# Patient Record
Sex: MALE | Race: BLACK OR AFRICAN AMERICAN | Employment: FULL TIME | URBAN - METROPOLITAN AREA
[De-identification: names, ages, dates, MRNs, and addresses within clinical notes are randomized per-mention and may not be internally consistent; named-entity substitution may affect disease eponyms.]

---

## 2022-03-03 ENCOUNTER — HOSPITAL ENCOUNTER (EMERGENCY)
Age: 32
Discharge: HOME | End: 2022-03-03

## 2022-03-03 VITALS
BODY MASS INDEX: 26.91 KG/M2 | HEART RATE: 88 BPM | RESPIRATION RATE: 16 BRPM | WEIGHT: 209.66 LBS | TEMPERATURE: 97.8 F | DIASTOLIC BLOOD PRESSURE: 81 MMHG | HEIGHT: 74 IN | OXYGEN SATURATION: 98 % | SYSTOLIC BLOOD PRESSURE: 148 MMHG

## 2022-03-03 DIAGNOSIS — S39.012A BACK STRAIN, INITIAL ENCOUNTER: Primary | ICD-10-CM

## 2022-03-03 DIAGNOSIS — S16.1XXA ACUTE STRAIN OF NECK MUSCLE, INITIAL ENCOUNTER: ICD-10-CM

## 2022-03-03 PROCEDURE — 6360000002 HC RX W HCPCS: Performed by: PHYSICIAN ASSISTANT

## 2022-03-03 PROCEDURE — 96372 THER/PROPH/DIAG INJ SC/IM: CPT

## 2022-03-03 PROCEDURE — 6370000000 HC RX 637 (ALT 250 FOR IP): Performed by: PHYSICIAN ASSISTANT

## 2022-03-03 PROCEDURE — 99282 EMERGENCY DEPT VISIT SF MDM: CPT

## 2022-03-03 RX ORDER — PREDNISONE 20 MG/1
20 TABLET ORAL 2 TIMES DAILY
Qty: 10 TABLET | Refills: 0 | Status: SHIPPED | OUTPATIENT
Start: 2022-03-03 | End: 2022-03-08

## 2022-03-03 RX ORDER — CYCLOBENZAPRINE HCL 10 MG
10 TABLET ORAL 3 TIMES DAILY PRN
Qty: 21 TABLET | Refills: 0 | Status: SHIPPED | OUTPATIENT
Start: 2022-03-03 | End: 2022-03-13

## 2022-03-03 RX ORDER — DEXAMETHASONE SODIUM PHOSPHATE 4 MG/ML
4 INJECTION, SOLUTION INTRA-ARTICULAR; INTRALESIONAL; INTRAMUSCULAR; INTRAVENOUS; SOFT TISSUE ONCE
Status: COMPLETED | OUTPATIENT
Start: 2022-03-03 | End: 2022-03-03

## 2022-03-03 RX ORDER — CYCLOBENZAPRINE HCL 10 MG
10 TABLET ORAL ONCE
Status: COMPLETED | OUTPATIENT
Start: 2022-03-03 | End: 2022-03-03

## 2022-03-03 RX ADMIN — DEXAMETHASONE SODIUM PHOSPHATE 4 MG: 4 INJECTION, SOLUTION INTRAMUSCULAR; INTRAVENOUS at 20:44

## 2022-03-03 RX ADMIN — CYCLOBENZAPRINE HYDROCHLORIDE 10 MG: 10 TABLET, FILM COATED ORAL at 20:46

## 2022-03-03 ASSESSMENT — PAIN DESCRIPTION - LOCATION: LOCATION: BACK;NECK

## 2022-03-03 ASSESSMENT — ENCOUNTER SYMPTOMS
CONSTIPATION: 1
VOMITING: 0
DIARRHEA: 0
BACK PAIN: 1

## 2022-03-03 ASSESSMENT — PAIN - FUNCTIONAL ASSESSMENT: PAIN_FUNCTIONAL_ASSESSMENT: 0-10

## 2022-03-03 ASSESSMENT — PAIN SCALES - GENERAL: PAINLEVEL_OUTOF10: 10

## 2022-03-03 ASSESSMENT — PAIN DESCRIPTION - DESCRIPTORS: DESCRIPTORS: SHARP;BURNING

## 2022-03-03 ASSESSMENT — PAIN DESCRIPTION - ONSET: ONSET: GRADUAL

## 2022-03-03 ASSESSMENT — PAIN DESCRIPTION - FREQUENCY: FREQUENCY: CONTINUOUS

## 2022-03-03 ASSESSMENT — PAIN DESCRIPTION - PAIN TYPE: TYPE: ACUTE PAIN

## 2022-03-03 NOTE — Clinical Note
Darren Betancourt was seen and treated in our emergency department on 3/3/2022. He may return to work on 03/07/2022. If you have any questions or concerns, please don't hesitate to call.       Ponce Munguia PA-C

## 2022-03-04 NOTE — ED PROVIDER NOTES
[address] [facility]        Pt Name: Darren Betancourt  MRN: [id number]  [place] 1990  Date of evaluation: 3/3/2022  Provider: Ponce Munguia PA-C  PCP: No primary care provider on file. Note Started: 8:20 PM EST      RACHELLE. I have evaluated this patient. My supervising physician was available for consultation. Triage CHIEF COMPLAINT       Chief Complaint   Patient presents with    Back Pain     pt states upper back/neck pain for 6 days         HISTORY OF PRESENT ILLNESS   (Location/Symptom, Timing/Onset, Context/Setting, Quality, Duration, Modifying Factors, Severity)  Note limiting factors. Chief Complaint: Neck pain    Darren Betancourt is a 36 y.o. male who presents to the ED with complaints of neck pain and mid back pain that started 6 days ago. Patient states that occasionally the pain will radiate down into his arm, and leg. He states that he woke up with this pain, denies any recent trauma. Patient states that he has had constipation for the past several days, however this occurred after he took a Percocet. He denies fevers, chills, difficulty urinating, IV drug use. Nursing Notes were all reviewed and agreed with or any disagreements were addressed in the HPI. REVIEW OF SYSTEMS    (2-9 systems for level 4, 10 or more for level 5)     Review of Systems   Constitutional: Negative for chills and fever. Gastrointestinal: Positive for constipation. Negative for diarrhea and vomiting. Genitourinary: Negative for decreased urine volume and difficulty urinating. Musculoskeletal: Positive for back pain and neck pain. Negative for gait problem, myalgias and neck stiffness. Skin: Negative for rash and wound. PAST MEDICAL HISTORY   History reviewed. No pertinent past medical history. SURGICAL HISTORY   History reviewed. No pertinent surgical history.     CURRENTMEDICATIONS       Previous Medications    No medications on file       ALLERGIES     Amoxicillin    FAMILYHISTORY     History reviewed. No pertinent family history. SOCIAL HISTORY       Social History     Socioeconomic History    Marital status: None     Spouse name: None    Number of children: None    Years of education: None    Highest education level: None   Occupational History    None   Tobacco Use    Smoking status: Current Every Day Smoker     Packs/day: 0.50    Smokeless tobacco: Never Used   Substance and Sexual Activity    Alcohol use: Never    Drug use: Yes     Types: Marijuana Veldon Bunting)    Sexual activity: None   Other Topics Concern    None   Social History Narrative    None     Social Determinants of Health     Financial Resource Strain:     Difficulty of Paying Living Expenses: Not on file   Food Insecurity:     Worried About Running Out of Food in the Last Year: Not on file    Yandel of Food in the Last Year: Not on file   Transportation Needs:     Lack of Transportation (Medical): Not on file    Lack of Transportation (Non-Medical):  Not on file   Physical Activity:     Days of Exercise per Week: Not on file    Minutes of Exercise per Session: Not on file   Stress:     Feeling of Stress : Not on file   Social Connections:     Frequency of Communication with Friends and Family: Not on file    Frequency of Social Gatherings with Friends and Family: Not on file    Attends [employer]ist Services: Not on file    Active Member of [address] [place] [address] or Organizations: Not on file    Attends Club or Organization Meetings: Not on file    Marital Status: Not on file   Intimate Partner Violence:     Fear of Current or Ex-Partner: Not on file    Emotionally Abused: Not on file    Physically Abused: Not on file    Sexually Abused: Not on file   Housing Stability:     Unable to Pay for Housing in the Last Year: Not on file    Number of [place] in the Last Year: Not on file    Unstable Housing in the Last Year: Not on file SCREENINGS             PHYSICAL EXAM    (up to 7 for level 4, 8 or more for level 5)     ED Triage Vitals [03/03/22 1932]   BP Temp Temp Source Pulse Resp SpO2 Height Weight   (!) 151/101 97.2 °F (36.2 °C) Tympanic 91 14 98 % 6' 2\" (1.88 m) 209 lb 10.5 oz (95.1 kg)       Physical Exam  Constitutional:       General: He is not in acute distress. Appearance: Normal appearance. He is not ill-appearing, toxic-appearing or diaphoretic. HENT:      Head: Normocephalic and atraumatic. Right Ear: External ear normal.      Left Ear: External ear normal.      Nose: Nose normal.   Eyes:      General:         Right eye: No discharge. Left eye: No discharge. Neck:      Comments: Patient with midline and left-sided tenderness to neck  No difficulty with active range of motion  Sensation intact in bilateral arms, bilateral legs  + Straight leg test on left side  No rashes seen. Patient does have a scar to the left side of his neck, that he states is self-inflicted  Pulmonary:      Effort: Pulmonary effort is normal. No respiratory distress. Abdominal:      General: Bowel sounds are normal. There is no distension. Palpations: Abdomen is soft. Tenderness: There is no abdominal tenderness. There is no right CVA tenderness, left CVA tenderness or rebound. Musculoskeletal:         General: Normal range of motion. Cervical back: Normal range of motion. Tenderness present. Skin:     General: Skin is warm and dry. Neurological:      General: No focal deficit present. Mental Status: He is alert and oriented to person, place, and time. Psychiatric:         Mood and Affect: Mood normal.         Behavior: Behavior normal.         DIAGNOSTIC RESULTS   LABS:    Labs Reviewed - No data to display    When ordered, only abnormal lab results are displayed. All other labs were within normal range or not returned as of this dictation. EKG:  When ordered, EKG's are interpreted by the Emergency Department Physician in the absence of a cardiologist.  Please see their note for interpretation of EKG. RADIOLOGY:   Non-plain film images such as CT, Ultrasound and MRI are read by the radiologist. Plain radiographic images are visualized andpreliminarily interpreted by the  ED Provider with the below findings:        Interpretation perthe Radiologist below, if available at the time of this note:    No orders to display     No results found. PROCEDURES   Unless otherwise noted below, none     Procedures    CRITICAL CARE TIME   N/A    CONSULTS:  None      EMERGENCY DEPARTMENT COURSE and DIFFERENTIAL DIAGNOSIS/MDM:   Vitals:    Vitals:    03/03/22 1932   BP: (!) 151/101   Pulse: 91   Resp: 14   Temp: 97.2 °F (36.2 °C)   TempSrc: Tympanic   SpO2: 98%   Weight: 209 lb 10.5 oz (95.1 kg)   Height: 6' 2\" (1.88 m)       Patient was given thefollowing medications:  Medications   dexamethasone (DECADRON) injection 4 mg (has no administration in time range)           This is a 72-year-old male who presents ED with complaints of left-sided neck pain that started about 6 days ago. Upon arrival patient's vitals show that he is hypertensive but otherwise within normal limits. Discussed with patient imaging, via CT and he declined at this time due to radiation exposure. He would like to try conservative measures first.  We will try him on prednisone and muscle relaxers for further relief. Discussed with patient active stretching, continued movement. He will be discharged in stable condition to his home. He was given a shot of IM Decadron prior to discharge. Patient was agreeable to follow-up with orthopedic, and return to ED if new worsening or more concerning symptoms present. FINAL IMPRESSION      1. Back strain, initial encounter    2.  Acute strain of neck muscle, initial encounter          DISPOSITION/PLAN   DISPOSITION Decision To Discharge 03/03/2022 08:19:43 PM      PATIENT REFERREDTO:  Harsha Hoffman Linda Andersen.   [address]    Schedule an appointment as soon as possible for a visit in 2 days  for reevaluation    [facility] Emergency Department  [address] [id number] [phone]  Go to   As needed, If symptoms worsen      DISCHARGE MEDICATIONS:  New Prescriptions    CYCLOBENZAPRINE (FLEXERIL) 10 MG TABLET    Take 1 tablet by mouth 3 times daily as needed for Muscle spasms    PREDNISONE (DELTASONE) 20 MG TABLET    Take 1 tablet by mouth 2 times daily for 5 days       DISCONTINUED MEDICATIONS:  Discontinued Medications    No medications on file              (Please note that portions ofthis note were completed with a voice recognition program.  Efforts were made to edit the dictations but occasionally words are mis-transcribed.)    Henrik Hammond PA-C (electronically signed)             Henrik Hammond PA-C  03/03/22 9586

## 2022-03-10 ENCOUNTER — HOSPITAL ENCOUNTER (EMERGENCY)
Age: 32
Discharge: HOME | End: 2022-03-10
Attending: EMERGENCY MEDICINE

## 2022-03-10 ENCOUNTER — APPOINTMENT (OUTPATIENT)
Dept: GENERAL RADIOLOGY | Age: 32
End: 2022-03-10

## 2022-03-10 VITALS
SYSTOLIC BLOOD PRESSURE: 134 MMHG | WEIGHT: 211.64 LBS | TEMPERATURE: 98 F | DIASTOLIC BLOOD PRESSURE: 83 MMHG | OXYGEN SATURATION: 99 % | RESPIRATION RATE: 17 BRPM | BODY MASS INDEX: 27.16 KG/M2 | HEIGHT: 74 IN | HEART RATE: 87 BPM

## 2022-03-10 DIAGNOSIS — J18.9 PNEUMONIA OF RIGHT LOWER LOBE DUE TO INFECTIOUS ORGANISM: Primary | ICD-10-CM

## 2022-03-10 LAB
A/G RATIO: 1.6 (ref 1.1–2.2)
ALBUMIN SERPL-MCNC: 4.1 G/DL (ref 3.4–5)
ALP BLD-CCNC: 72 U/L (ref 40–129)
ALT SERPL-CCNC: 10 U/L (ref 10–40)
ANION GAP SERPL CALCULATED.3IONS-SCNC: 13 MMOL/L (ref 3–16)
AST SERPL-CCNC: 9 U/L (ref 15–37)
BASOPHILS ABSOLUTE: 0.1 K/UL (ref 0–0.2)
BASOPHILS RELATIVE PERCENT: 1 %
BILIRUB SERPL-MCNC: 0.3 MG/DL (ref 0–1)
BUN BLDV-MCNC: 11 MG/DL (ref 7–20)
CALCIUM SERPL-MCNC: 8.8 MG/DL (ref 8.3–10.6)
CHLORIDE BLD-SCNC: 106 MMOL/L (ref 99–110)
CO2: 23 MMOL/L (ref 21–32)
CREAT SERPL-MCNC: 0.9 MG/DL (ref 0.9–1.3)
D DIMER: <200 NG/ML DDU (ref 0–229)
EKG ATRIAL RATE: 92 BPM
EKG DIAGNOSIS: NORMAL
EKG P AXIS: 45 DEGREES
EKG P-R INTERVAL: 154 MS
EKG Q-T INTERVAL: 320 MS
EKG QRS DURATION: 92 MS
EKG QTC CALCULATION (BAZETT): 395 MS
EKG R AXIS: 66 DEGREES
EKG T AXIS: 41 DEGREES
EKG VENTRICULAR RATE: 92 BPM
EOSINOPHILS ABSOLUTE: 0.5 K/UL (ref 0–0.6)
EOSINOPHILS RELATIVE PERCENT: 5.4 %
GFR AFRICAN AMERICAN: >60
GFR NON-AFRICAN AMERICAN: >60
GLUCOSE BLD-MCNC: 85 MG/DL (ref 70–99)
HCT VFR BLD CALC: 45.2 % (ref 40.5–52.5)
HEMOGLOBIN: 14.8 G/DL (ref 13.5–17.5)
LYMPHOCYTES ABSOLUTE: 3.6 K/UL (ref 1–5.1)
LYMPHOCYTES RELATIVE PERCENT: 42.8 %
MCH RBC QN AUTO: 27.9 PG (ref 26–34)
MCHC RBC AUTO-ENTMCNC: 32.8 G/DL (ref 31–36)
MCV RBC AUTO: 85.1 FL (ref 80–100)
MONOCYTES ABSOLUTE: 0.7 K/UL (ref 0–1.3)
MONOCYTES RELATIVE PERCENT: 8.2 %
NEUTROPHILS ABSOLUTE: 3.5 K/UL (ref 1.7–7.7)
NEUTROPHILS RELATIVE PERCENT: 42.6 %
PDW BLD-RTO: 13 % (ref 12.4–15.4)
PLATELET # BLD: 205 K/UL (ref 135–450)
PMV BLD AUTO: 9.6 FL (ref 5–10.5)
POTASSIUM REFLEX MAGNESIUM: 3.6 MMOL/L (ref 3.5–5.1)
RBC # BLD: 5.31 M/UL (ref 4.2–5.9)
SARS-COV-2, NAAT: NOT DETECTED
SODIUM BLD-SCNC: 142 MMOL/L (ref 136–145)
TOTAL PROTEIN: 6.7 G/DL (ref 6.4–8.2)
TROPONIN: <0.01 NG/ML
WBC # BLD: 8.3 K/UL (ref 4–11)

## 2022-03-10 PROCEDURE — 85379 FIBRIN DEGRADATION QUANT: CPT

## 2022-03-10 PROCEDURE — 84484 ASSAY OF TROPONIN QUANT: CPT

## 2022-03-10 PROCEDURE — 71046 X-RAY EXAM CHEST 2 VIEWS: CPT

## 2022-03-10 PROCEDURE — 99284 EMERGENCY DEPT VISIT MOD MDM: CPT

## 2022-03-10 PROCEDURE — 85025 COMPLETE CBC W/AUTO DIFF WBC: CPT

## 2022-03-10 PROCEDURE — 93005 ELECTROCARDIOGRAM TRACING: CPT | Performed by: NURSE PRACTITIONER

## 2022-03-10 PROCEDURE — 93010 ELECTROCARDIOGRAM REPORT: CPT | Performed by: INTERNAL MEDICINE

## 2022-03-10 PROCEDURE — 6370000000 HC RX 637 (ALT 250 FOR IP): Performed by: NURSE PRACTITIONER

## 2022-03-10 PROCEDURE — 87635 SARS-COV-2 COVID-19 AMP PRB: CPT

## 2022-03-10 PROCEDURE — 80053 COMPREHEN METABOLIC PANEL: CPT

## 2022-03-10 RX ORDER — LIDOCAINE 4 G/G
1 PATCH TOPICAL DAILY
Status: DISCONTINUED | OUTPATIENT
Start: 2022-03-10 | End: 2022-03-10 | Stop reason: HOSPADM

## 2022-03-10 RX ORDER — ACETAMINOPHEN 500 MG
1000 TABLET ORAL ONCE
Status: COMPLETED | OUTPATIENT
Start: 2022-03-10 | End: 2022-03-10

## 2022-03-10 RX ORDER — ASPIRIN 81 MG/1
324 TABLET, CHEWABLE ORAL ONCE
Status: COMPLETED | OUTPATIENT
Start: 2022-03-10 | End: 2022-03-10

## 2022-03-10 RX ORDER — AZITHROMYCIN 250 MG/1
TABLET, FILM COATED ORAL
Qty: 1 PACKET | Refills: 0 | Status: SHIPPED | OUTPATIENT
Start: 2022-03-10 | End: 2022-03-20

## 2022-03-10 RX ORDER — AZITHROMYCIN 500 MG/1
500 TABLET, FILM COATED ORAL ONCE
Status: COMPLETED | OUTPATIENT
Start: 2022-03-10 | End: 2022-03-10

## 2022-03-10 RX ADMIN — AZITHROMYCIN MONOHYDRATE 500 MG: 500 TABLET ORAL at 12:01

## 2022-03-10 RX ADMIN — ACETAMINOPHEN 1000 MG: 500 TABLET ORAL at 09:15

## 2022-03-10 RX ADMIN — ASPIRIN 81 MG 324 MG: 81 TABLET ORAL at 09:15

## 2022-03-10 ASSESSMENT — PAIN SCALES - GENERAL
PAINLEVEL_OUTOF10: 8
PAINLEVEL_OUTOF10: 8
PAINLEVEL_OUTOF10: 4

## 2022-03-10 ASSESSMENT — PAIN DESCRIPTION - PAIN TYPE
TYPE: ACUTE PAIN
TYPE: ACUTE PAIN

## 2022-03-10 ASSESSMENT — PAIN DESCRIPTION - DESCRIPTORS: DESCRIPTORS: PRESSURE

## 2022-03-10 ASSESSMENT — PAIN DESCRIPTION - ORIENTATION
ORIENTATION: LEFT
ORIENTATION: LEFT

## 2022-03-10 ASSESSMENT — PAIN DESCRIPTION - PROGRESSION: CLINICAL_PROGRESSION: NOT CHANGED

## 2022-03-10 ASSESSMENT — PAIN - FUNCTIONAL ASSESSMENT: PAIN_FUNCTIONAL_ASSESSMENT: PREVENTS OR INTERFERES SOME ACTIVE ACTIVITIES AND ADLS

## 2022-03-10 ASSESSMENT — PAIN DESCRIPTION - ONSET: ONSET: ON-GOING

## 2022-03-10 ASSESSMENT — PAIN DESCRIPTION - LOCATION
LOCATION: CHEST
LOCATION: CHEST

## 2022-03-10 ASSESSMENT — PAIN DESCRIPTION - FREQUENCY: FREQUENCY: CONTINUOUS

## 2022-03-10 NOTE — ED PROVIDER NOTES
[facility] Emergency Department    CHIEF COMPLAINT  Chest Pain (Pt to ED with c/o lower back pain and left side chest pain. States chest pain radiates to his shoulder and upper back, started 3 days ago. States lower back pain started started 1 week ago.) and Back Pain      SHARED SERVICE VISIT  I have seen and evaluated this patient with my supervising physician, Dr. Tiffanie Granados. HISTORY OF PRESENT ILLNESS  Claudette Mire is a 36 y.o., nontoxic, well-appearing male in mild distress who presents to the ED complaining of exacerbation of mid left back pain which 7 days ago and has progressively worsened. He was seen here at that time and was prescribed a steroid and a muscle relaxer and was to follow-up with orthopedics. He states the earliest appointment he can get will be in 2 weeks and therefore returns to the emergency department. He states that for the past 2 days he has been experiencing exacerbation of left-sided chest pain with radiation to his left arm. He describes the left arm discomfort as \"numbness\" to severity of 5/10. Accompanying symptoms include lightheadedness. Denies nausea, vomiting, dizziness, presyncope shortness of breath, diaphoresis, fever, chills, sweats, cough, hemoptysis, leg/calf pain or swelling, abdominal pain, diarrhea, or other concerns. Nursing notes reviewed. History reviewed. No pertinent past medical history. History reviewed. No pertinent surgical history. History reviewed. No pertinent family history.   Social History     Socioeconomic History    Marital status: Single     Spouse name: Not on file    Number of children: Not on file    Years of education: Not on file    Highest education level: Not on file   Occupational History    Not on file   Tobacco Use    Smoking status: Current Every Day Smoker     Packs/day: 0.50    Smokeless tobacco: Never Used   Vaping Use    Vaping Use: Never used   Substance and Sexual Activity  Alcohol use: Never    Drug use: Yes     Types: Marijuana [employer]ma Marts)    Sexual activity: Not Currently   Other Topics Concern    Not on file   Social History Narrative    Not on file     Social Determinants of Health     Financial Resource Strain:     Difficulty of Paying Living Expenses: Not on file   Food Insecurity:     Worried About Running Out of Food in the Last Year: Not on file    Yandel of Food in the Last Year: Not on file   Transportation Needs:     Lack of Transportation (Medical): Not on file    Lack of Transportation (Non-Medical):  Not on file   Physical Activity:     Days of Exercise per Week: Not on file    Minutes of Exercise per Session: Not on file   Stress:     Feeling of Stress : Not on file   Social Connections:     Frequency of Communication with Friends and Family: Not on file    Frequency of Social Gatherings with Friends and Family: Not on file    Attends Jain Services: Not on file    Active Member of [employer] or Organizations: Not on file    Attends Club or Organization Meetings: Not on file    Marital Status: Not on file   Intimate Partner Violence:     Fear of Current or Ex-Partner: Not on file    Emotionally Abused: Not on file    Physically Abused: Not on file    Sexually Abused: Not on file   Housing Stability:     Unable to Pay for Housing in the Last Year: Not on file    Number of [place] in the Last Year: Not on file    Unstable Housing in the Last Year: Not on file     Current Facility-Administered Medications   Medication Dose Route Frequency Provider Last Rate Last Admin    aspirin chewable tablet 324 mg  324 mg Oral Once Derek Marlonril, APRN - CNP        lidocaine 4 % external patch 1 patch  1 patch TransDERmal Daily Derek Mandril, APRN - CNP        acetaminophen (TYLENOL) tablet 1,000 mg  1,000 mg Oral Once Derek Marlonril APRN - CNP         Current Outpatient Medications   Medication Sig Dispense Refill    cyclobenzaprine (FLEXERIL) 10 MG tablet Take 1 tablet by mouth 3 times daily as needed for Muscle spasms 21 tablet 0     Allergies   Allergen Reactions    Amoxicillin        REVIEW OF SYSTEMS  12 systems reviewed, pertinent positives per HPI otherwise noted to be negative    PHYSICAL EXAM  BP (!) 137/96   Pulse 102   Temp 97.8 °F (36.6 °C) (Oral)   Resp 16   Ht 6' 2\" (1.88 m)   Wt 211 lb 10.3 oz (96 kg)   SpO2 100%   BMI 27.17 kg/m²   GENERAL APPEARANCE: Awake and alert. Cooperative.  + Mild distress. Non-toxic in appearance. HEAD: Normocephalic. Atraumatic. EYES: PERRL. EOM's grossly intact. ENT: Mucous membranes are moist.   NECK: Supple. HEART: RRR. No murmurs, rubs, gallops.  + S1-S2  LUNGS: Respirations unlabored. CTAB. Good air exchange. Speaking comfortably in full sentences. ABDOMEN: Soft. Non-distended. Non-tender. No guarding or rebound. There is no midline pulsatile abdominal mass. + Bowel sounds x4 quadrants. No masses. No organomegaly. EXTREMITIES: No peripheral edema. Moves all extremities equally. All extremities neurovascularly intact. Radial pulse equal bilaterally. SKIN: Warm and dry. No acute rashes. NEUROLOGICAL: Alert and oriented. CN's 2-12 intact. No gross facial drooping. Strength 5/5, sensation intact. PSYCHIATRIC: Normal mood and affect. RADIOLOGY  No results found. ED COURSE  Patient received lidocaine patch and Tylenol for pain, with good relief. Triage vitals stable but hypertensive at 137/96 mmHg to tachycardic rate of 102 bpm.  Cardiac workup was initiated to include CBC, BMP, D-dimer, cardiac labs, COVID-19, rapid, CXR, and EKG.       Labs Ordered:  I have reviewed and interpreted all of the currently available lab results from this visit:  Results for orders placed or performed during the hospital encounter of 03/10/22   COVID-19, Rapid    Specimen: Nasopharyngeal Swab   Result Value Ref Range    SARS-CoV-2, NAAT Not Detected Not Detected   CBC with Auto Differential   Result Value Ref Range    WBC 8.3 4.0 - 11.0 K/uL    RBC 5.31 4.20 - 5.90 M/uL    Hemoglobin 14.8 13.5 - 17.5 g/dL    Hematocrit 45.2 40.5 - 52.5 %    MCV 85.1 80.0 - 100.0 fL    MCH 27.9 26.0 - 34.0 pg    MCHC 32.8 31.0 - 36.0 g/dL    RDW 13.0 12.4 - 15.4 %    Platelets 093 976 - 079 K/uL    MPV 9.6 5.0 - 10.5 fL    Neutrophils % 42.6 %    Lymphocytes % 42.8 %    Monocytes % 8.2 %    Eosinophils % 5.4 %    Basophils % 1.0 %    Neutrophils Absolute 3.5 1.7 - 7.7 K/uL    Lymphocytes Absolute 3.6 1.0 - 5.1 K/uL    Monocytes Absolute 0.7 0.0 - 1.3 K/uL    Eosinophils Absolute 0.5 0.0 - 0.6 K/uL    Basophils Absolute 0.1 0.0 - 0.2 K/uL   Comprehensive Metabolic Panel w/ Reflex to MG   Result Value Ref Range    Sodium 142 136 - 145 mmol/L    Potassium reflex Magnesium 3.6 3.5 - 5.1 mmol/L    Chloride 106 99 - 110 mmol/L    CO2 23 21 - 32 mmol/L    Anion Gap 13 3 - 16    Glucose 85 70 - 99 mg/dL    BUN 11 7 - 20 mg/dL    CREATININE 0.9 0.9 - 1.3 mg/dL    GFR Non-African American >60 >60    GFR African American >60 >60    Calcium 8.8 8.3 - 10.6 mg/dL    Total Protein 6.7 6.4 - 8.2 g/dL    Albumin 4.1 3.4 - 5.0 g/dL    Albumin/Globulin Ratio 1.6 1.1 - 2.2    Total Bilirubin 0.3 0.0 - 1.0 mg/dL    Alkaline Phosphatase 72 40 - 129 U/L    ALT 10 10 - 40 U/L    AST 9 (L) 15 - 37 U/L   Troponin   Result Value Ref Range    Troponin <0.01 <0.01 ng/mL   D-Dimer, Quantitative   Result Value Ref Range    D-Dimer, Quant <200 0 - 229 ng/mL DDU   EKG 12 Lead   Result Value Ref Range    Ventricular Rate 92 BPM    Atrial Rate 92 BPM    P-R Interval 154 ms    QRS Duration 92 ms    Q-T Interval 320 ms    QTc Calculation (Bazett) 395 ms    P Axis 45 degrees    R Axis 66 degrees    T Axis 41 degrees    Diagnosis       Normal sinus rhythm with sinus arrhythmiaConfirmed by Spencer ALCAZAR MD ([id number]) on 3/10/2022 6:21:08 PM             Imaging ordered:  XR CHEST (2 VW)    Result Date: 3/10/2022  Mild right lower lobe infiltrate. Interventions:  Patient was given  here in the emergency department. Reevaluation:  On reevaluation I find the 79-year-old gentleman, Mr. Laury Rojas resting comfortably in [place] [address] chair with eyes open with stable vital signs. A discussion was had with Mr. Lauyr Rojas regarding pneumonia right lower lobe due to infectious organism and insisted discharge. Risk management discussed and shared decision making had with patient and/or surrogate. All questions were answered. Patient will follow up with the PRS for further evaluation/treatment. All questions answered. Patient will return to ED for new/worsening symptoms. Patient is agreeable with this plan. Patient was sent home with a prescription for Z-Shai. CRITICAL CARE TIME  0 Minutes of critical care time spent not including separately billable procedures. MDM  Patient presents to the emergency department with chest pain. Alternate diagnoses are less likely based on history and physical. Alternate diagnoses are less likely based on history and physical. Considered myocardial infarction, aortic dissection, pulmonary embolism, tension pneumothorax, endocarditis, GERD, and pneumonia but less likely based on history and physical. Considered MI but no ischemic changes on EKG and no elevation in troponin. Considered aortic dissection but less likely as no radiation of pain into back with ripping/tearing sensation, there are equal radial pulses bilaterally, and there is no midline pulsatile abdominal mass. Considered  pulmonary embolism but less likely as no cough or hemoptysis, and no DVT symptoms. D-dimer was not elevated. Considered COVID-19: Not detected by laboratory testing. Considered tension pneumothorax but less likely as no unilaterally diminished breath sounds or tracheal deviation. Considered endocarditis but less likely as no fever, murmur, or IV drug use.  Considered GERD but less likely as no postprandial epigastric abdominal/throat burning. Work-up reveals (Abnormal labs/imaging noted and otherwise normal)    No COVID-19, no elevation in D-dimer, no elevation of troponin, no electrolyte derangement, renal dysfunction, elevation in LFTs, no leukocytosis or anemia, no ischemic changes on EKG. However CXR identifies a mild right lower lobe infiltrate. Patient afebrile and is saturating at 99% on room air. No respiratory distress. He is otherwise young and healthy and therefore will be trialed on outpatient antibiotics. Strict return precautions. Patient verbalized understands agreeable to plan for discharge and follow-up. Clinical Impression:  Pneumonia of the right lower lobe due to infectious organism      DISPOSITION  Discharged with referral to the [facility] for outpatient follow-up    Katelynn Soria CNP   [employer]    This chart was created using Dragon dictation. Every effort was made by myself to ensure accuracy, however due to limitations of this technology errors may be present.       ESTUARDO Triplett - JOSELINE  03/13/22 0103

## 2022-03-10 NOTE — ED PROVIDER NOTES
[address] [facility]      Pt Name: Judy Bustos  MRN: [id number]  Brockgfadonis 1990  Date of evaluation: 3/10/2022  Provider: Claudean Flair, [address] [place] [address]  Chief Complaint   Patient presents with    Chest Pain     Pt to ED with c/o lower back pain and left side chest pain. States chest pain radiates to his shoulder and upper back, started 3 days ago. States lower back pain started started 1 week ago.  Back Pain       I have fully participated in the care of Judy Bustos and have had a face-to-face evaluation. I have reviewed and agree with all pertinent clinical information, and midlevel provider's history, and physical exam. I have also reviewed the labs, EKG, and imaging studies and treatment plan. I have also reviewed and agree with the medications, allergies and past medical history section for this Judy Bustos. I agree with the diagnosis, and I concur. I wore personal protective equipment when I was in the room the entire time. This includes gloves, N95 mask, face shield, and a glove over my stethoscope for protection. History reviewed. No pertinent past medical history. MDM:  Judy Bustos is a 36 y.o. male who presents with chest and back pain that started 3 days ago. Is gotten worse. He was evaluated by physician but has gotten worse. He denies any fevers or chills. Nuys any nausea vomiting. Denies any injuries. Nothing makes it better or worse. Denies any previous history. Physical exam reveals patient to be talking in full sentences. His pulse ox is normal.  Heart is regular rate and rhythm without murmurs clicks or rubs. Is mildly tachycardic. Lungs are clear auscultation equal bilaterally. There are no wheezes rales or rhonchi. Abdomen soft and nontender. Chest x-ray reveals a Right lower lobe infiltrate. D-dimer is negative. Troponin is negative.   The remainder of his work-up is unremarkable. His white count is normal.  He does not meet SIRS criteria for sepsis. His Covid test revealed a negative result. Therefore, patient was discharged with antibiotics. He was given prescription for transdermal patch and instructed to take a chewable aspirin daily. He was instructed to follow-up with his doctor in 3 to 5 days and return if any problems. Vitals:    03/10/22 1200   BP: 134/83   Pulse: 87   Resp: 17   Temp: 98 °F (36.7 °C)   SpO2: 99%       Lab results  Labs Reviewed   COMPREHENSIVE METABOLIC PANEL W/ REFLEX TO MG FOR LOW K - Abnormal; Notable for the following components:       Result Value    AST 9 (*)     All other components within normal limits   COVID-19, RAPID   CBC WITH AUTO DIFFERENTIAL   TROPONIN   D-DIMER, QUANTITATIVE       EKG Results  EKG Interpretation    Interpreted by emergency department physician  Time performed: 2639  Time read: 0835    Rhythm: Sinus  Ventricular Rate: 92  QRS Axis: 66  Ectopy: Sinus arrhythmia  Conduction: Sinus rhythm with sinus arrhythmia with LVH by voltage and early repolarization abnormalities, nonspecific interventricular conduction delay  ST Segments: Consistent with early repolarization abnormality  T Waves: Consistent with early repolarization abnormality  Q Waves: None noted    Other findings: Motion artifact making it difficult to read EKG    Compared to EKG on: None to compare    Clinical Impression: Sinus rhythm with sinus arrhythmia with LVH by voltage with early repolarization abnormalities and nonspecific interventricular conduction delay. There is motion artifact but EKG is readable. There is no old EKG to compare. Rachel 149, DO    Radiology results  XR CHEST (2 VW)   Final Result   Mild right lower lobe infiltrate. See discharge instructions for specific medications, discharge information, and treatments. They were verbally instructed to return to emergency if any problems.     Medications   aspirin chewable tablet 324 mg (324 mg Oral Given 3/10/22 0915)   acetaminophen (TYLENOL) tablet 1,000 mg (1,000 mg Oral Given 3/10/22 0915)   azithromycin (ZITHROMAX) tablet 500 mg (500 mg Oral Given 3/10/22 1201)       Discharge Medication List as of 3/10/2022 12:05 PM      START taking these medications    Details   azithromycin (ZITHROMAX) 250 MG tablet Take 2 tablets (500 mg) on Day 1, followed by 1 tablet (250 mg) once daily on Days 2 through 5., Disp-1 packet, R-0Print             The patient's blood pressure was found to be elevated according to CMS/Medicare and the Affordable Care Act/ObamaCare criteria. Elevated blood pressure could occur because of pain or anxiety or other reasons and does not mean that they need to have their blood pressure treated or medications otherwise adjusted. However, this could also be a sign that they will need to have their blood pressure treated or medications changed. The patient was instructed to follow up closely with their personal physician to have their blood pressure rechecked. The patient was instructed to take a list of recent blood pressure readings to their next visit with their personal physician. IMPRESSIONS:  1.  Pneumonia of right lower lobe due to infectious organism               Helen Almeida DO  03/10/22 8042

## 2025-04-18 ENCOUNTER — APPOINTMENT (OUTPATIENT)
Dept: GENERAL RADIOLOGY | Age: 35
End: 2025-04-18
Payer: COMMERCIAL

## 2025-04-18 ENCOUNTER — HOSPITAL ENCOUNTER (EMERGENCY)
Age: 35
Discharge: HOME | End: 2025-04-18
Attending: EMERGENCY MEDICINE
Payer: COMMERCIAL

## 2025-04-18 VITALS
OXYGEN SATURATION: 100 % | HEART RATE: 82 BPM | SYSTOLIC BLOOD PRESSURE: 138 MMHG | DIASTOLIC BLOOD PRESSURE: 98 MMHG | HEIGHT: 74 IN | RESPIRATION RATE: 18 BRPM | TEMPERATURE: 97.8 F | BODY MASS INDEX: 30.47 KG/M2 | WEIGHT: 237.44 LBS

## 2025-04-18 DIAGNOSIS — S93.602A SPRAIN OF LEFT FOOT, INITIAL ENCOUNTER: Primary | ICD-10-CM

## 2025-04-18 PROCEDURE — 6370000000 HC RX 637 (ALT 250 FOR IP): Performed by: EMERGENCY MEDICINE

## 2025-04-18 PROCEDURE — 99283 EMERGENCY DEPT VISIT LOW MDM: CPT

## 2025-04-18 PROCEDURE — 29515 APPLICATION SHORT LEG SPLINT: CPT

## 2025-04-18 PROCEDURE — 73630 X-RAY EXAM OF FOOT: CPT

## 2025-04-18 RX ORDER — IBUPROFEN 400 MG/1
800 TABLET, FILM COATED ORAL ONCE
Status: COMPLETED | OUTPATIENT
Start: 2025-04-18 | End: 2025-04-18

## 2025-04-18 RX ORDER — HYDROCODONE BITARTRATE AND ACETAMINOPHEN 5; 325 MG/1; MG/1
1 TABLET ORAL EVERY 6 HOURS PRN
Qty: 12 TABLET | Refills: 0 | Status: SHIPPED | OUTPATIENT
Start: 2025-04-18 | End: 2025-04-21

## 2025-04-18 RX ADMIN — IBUPROFEN 800 MG: 400 TABLET, FILM COATED ORAL at 04:00

## 2025-04-18 ASSESSMENT — LIFESTYLE VARIABLES
HOW MANY STANDARD DRINKS CONTAINING ALCOHOL DO YOU HAVE ON A TYPICAL DAY: PATIENT DOES NOT DRINK
HOW OFTEN DO YOU HAVE A DRINK CONTAINING ALCOHOL: NEVER

## 2025-04-18 ASSESSMENT — PAIN DESCRIPTION - ORIENTATION: ORIENTATION: LEFT

## 2025-04-18 ASSESSMENT — PAIN DESCRIPTION - LOCATION: LOCATION: FOOT

## 2025-04-18 ASSESSMENT — PAIN SCALES - GENERAL: PAINLEVEL_OUTOF10: 10

## 2025-04-18 NOTE — ED PROVIDER NOTES
THE [facility]  EMERGENCY DEPARTMENT ENCOUNTER          ATTENDING PHYSICIAN NOTE       Date of evaluation: 4/18/2025    Chief Complaint     Foot Pain (Patient states left foot pain radiating up to thigh rate pain 10/10)      History of Present Illness     Cedric Freeman is a 35 y.o. male who presents with complaints of left lateral foot pain rated 10 out of 10 that woke him up from sleep tonight.  A couple of days ago, the patient's leg fell asleep and when he went to get up he stood up and his ankle gave out and he rolled his ankle.  At that time he did not have much pain and he took some ibuprofen.  Overnight, he developed left lateral foot pain that has become very severe at this time and now he is having difficulty bearing weight.  The pain also now radiates up the left leg up to the thigh.  He denies any injuries to the upper portion of the leg however.      Review of Systems     Denies numbness, paresthesia, weakness.  He did have numbness in the left leg prior to rolling it because his leg had fallen asleep due to the way he was sitting.  See HPI for further details.  Review of systems otherwise negative.    Past Medical, Surgical, Family, and Social History     Nursing Notes, Past Medical Hx, Past Surgical Hx, Social Hx, Allergies, and Family Hx were all reviewed in the electronic record and agreed with, or any disagreements were addressed in the HPI or corrected in the EMR.    Medications     Discharge Medication List as of 4/18/2025  5:06 AM          Allergies     He is allergic to amoxicillin.    Physical Exam     INITIAL VITALS: BP: (!) 138/98, Temp: 97.8 °F (36.6 °C), Pulse: 82, Respirations: 18, SpO2: 100 %   Constitutional:  Well developed, well nourished, no acute distress, non-toxic appearance   Musculoskeletal: Tenderness to palpation of the left foot over the fifth metatarsal and dorsal lateral aspect of the left foot.  Ankle is nontender.  Range of motion limited by pain.  Integument:

## 2025-05-20 ENCOUNTER — APPOINTMENT (OUTPATIENT)
Dept: CT IMAGING | Age: 35
End: 2025-05-20
Payer: COMMERCIAL

## 2025-05-20 ENCOUNTER — HOSPITAL ENCOUNTER (EMERGENCY)
Age: 35
Discharge: HOME | End: 2025-05-20
Attending: EMERGENCY MEDICINE
Payer: COMMERCIAL

## 2025-05-20 ENCOUNTER — APPOINTMENT (OUTPATIENT)
Dept: GENERAL RADIOLOGY | Age: 35
End: 2025-05-20
Payer: COMMERCIAL

## 2025-05-20 VITALS
BODY MASS INDEX: 28.83 KG/M2 | HEIGHT: 74 IN | SYSTOLIC BLOOD PRESSURE: 131 MMHG | WEIGHT: 224.6 LBS | RESPIRATION RATE: 15 BRPM | DIASTOLIC BLOOD PRESSURE: 98 MMHG | OXYGEN SATURATION: 100 % | TEMPERATURE: 98.1 F | HEART RATE: 60 BPM

## 2025-05-20 DIAGNOSIS — J02.9 ACUTE PHARYNGITIS, UNSPECIFIED ETIOLOGY: ICD-10-CM

## 2025-05-20 DIAGNOSIS — M54.12 CERVICAL RADICULOPATHY: Primary | ICD-10-CM

## 2025-05-20 LAB
ANION GAP SERPL CALCULATED.3IONS-SCNC: 10 MMOL/L (ref 3–16)
BASOPHILS # BLD: 0.1 K/UL (ref 0–0.2)
BASOPHILS NFR BLD: 1 %
BUN SERPL-MCNC: 9 MG/DL (ref 7–20)
CALCIUM SERPL-MCNC: 9.4 MG/DL (ref 8.3–10.6)
CHLORIDE SERPL-SCNC: 104 MMOL/L (ref 99–110)
CO2 SERPL-SCNC: 27 MMOL/L (ref 21–32)
CREAT SERPL-MCNC: 1.1 MG/DL (ref 0.9–1.3)
DEPRECATED RDW RBC AUTO: 12.8 % (ref 12.4–15.4)
EOSINOPHIL # BLD: 1.6 K/UL (ref 0–0.6)
EOSINOPHIL NFR BLD: 17.1 %
GFR SERPLBLD CREATININE-BSD FMLA CKD-EPI: >90 ML/MIN/{1.73_M2}
GLUCOSE SERPL-MCNC: 112 MG/DL (ref 70–99)
HCT VFR BLD AUTO: 42.8 % (ref 40.5–52.5)
HGB BLD-MCNC: 15 G/DL (ref 13.5–17.5)
LYMPHOCYTES # BLD: 2.8 K/UL (ref 1–5.1)
LYMPHOCYTES NFR BLD: 31.1 %
MCH RBC QN AUTO: 28.9 PG (ref 26–34)
MCHC RBC AUTO-ENTMCNC: 35.1 G/DL (ref 31–36)
MCV RBC AUTO: 82.5 FL (ref 80–100)
MONOCYTES # BLD: 0.5 K/UL (ref 0–1.3)
MONOCYTES NFR BLD: 6 %
NEUTROPHILS # BLD: 4.1 K/UL (ref 1.7–7.7)
NEUTROPHILS NFR BLD: 44.8 %
PLATELET # BLD AUTO: 181 K/UL (ref 135–450)
PMV BLD AUTO: 9.3 FL (ref 5–10.5)
POTASSIUM SERPL-SCNC: 3.9 MMOL/L (ref 3.5–5.1)
RBC # BLD AUTO: 5.19 M/UL (ref 4.2–5.9)
SODIUM SERPL-SCNC: 141 MMOL/L (ref 136–145)
WBC # BLD AUTO: 9.1 K/UL (ref 4–11)

## 2025-05-20 PROCEDURE — 80048 BASIC METABOLIC PNL TOTAL CA: CPT

## 2025-05-20 PROCEDURE — 71046 X-RAY EXAM CHEST 2 VIEWS: CPT

## 2025-05-20 PROCEDURE — 96375 TX/PRO/DX INJ NEW DRUG ADDON: CPT

## 2025-05-20 PROCEDURE — 70491 CT SOFT TISSUE NECK W/DYE: CPT

## 2025-05-20 PROCEDURE — 6370000000 HC RX 637 (ALT 250 FOR IP): Performed by: EMERGENCY MEDICINE

## 2025-05-20 PROCEDURE — 85025 COMPLETE CBC W/AUTO DIFF WBC: CPT

## 2025-05-20 PROCEDURE — 96374 THER/PROPH/DIAG INJ IV PUSH: CPT

## 2025-05-20 PROCEDURE — 6360000002 HC RX W HCPCS: Performed by: EMERGENCY MEDICINE

## 2025-05-20 PROCEDURE — 99285 EMERGENCY DEPT VISIT HI MDM: CPT

## 2025-05-20 PROCEDURE — 72125 CT NECK SPINE W/O DYE: CPT

## 2025-05-20 PROCEDURE — 6360000004 HC RX CONTRAST MEDICATION: Performed by: EMERGENCY MEDICINE

## 2025-05-20 RX ORDER — DEXAMETHASONE SODIUM PHOSPHATE 10 MG/ML
8 INJECTION, SOLUTION INTRAMUSCULAR; INTRAVENOUS ONCE
Status: COMPLETED | OUTPATIENT
Start: 2025-05-20 | End: 2025-05-20

## 2025-05-20 RX ORDER — IOPAMIDOL 755 MG/ML
75 INJECTION, SOLUTION INTRAVASCULAR
Status: COMPLETED | OUTPATIENT
Start: 2025-05-20 | End: 2025-05-20

## 2025-05-20 RX ORDER — LIDOCAINE 4 G/G
1 PATCH TOPICAL ONCE
Status: DISCONTINUED | OUTPATIENT
Start: 2025-05-20 | End: 2025-05-20 | Stop reason: HOSPADM

## 2025-05-20 RX ORDER — KETOROLAC TROMETHAMINE 30 MG/ML
15 INJECTION, SOLUTION INTRAMUSCULAR; INTRAVENOUS ONCE
Status: COMPLETED | OUTPATIENT
Start: 2025-05-20 | End: 2025-05-20

## 2025-05-20 RX ORDER — LIDOCAINE 4 G/G
1 PATCH TOPICAL DAILY
Status: DISCONTINUED | OUTPATIENT
Start: 2025-05-20 | End: 2025-05-20

## 2025-05-20 RX ADMIN — KETOROLAC TROMETHAMINE 15 MG: 30 INJECTION, SOLUTION INTRAMUSCULAR at 03:33

## 2025-05-20 RX ADMIN — DEXAMETHASONE SODIUM PHOSPHATE 8 MG: 10 INJECTION, SOLUTION INTRAMUSCULAR; INTRAVENOUS at 05:52

## 2025-05-20 RX ADMIN — IOPAMIDOL 75 ML: 755 INJECTION, SOLUTION INTRAVENOUS at 04:26

## 2025-05-20 ASSESSMENT — PAIN - FUNCTIONAL ASSESSMENT
PAIN_FUNCTIONAL_ASSESSMENT: NONE - DENIES PAIN
PAIN_FUNCTIONAL_ASSESSMENT: 0-10

## 2025-05-20 ASSESSMENT — PAIN DESCRIPTION - LOCATION
LOCATION: THROAT

## 2025-05-20 ASSESSMENT — PAIN SCALES - GENERAL
PAINLEVEL_OUTOF10: 8
PAINLEVEL_OUTOF10: 6
PAINLEVEL_OUTOF10: 10

## 2025-05-20 ASSESSMENT — PAIN DESCRIPTION - DESCRIPTORS
DESCRIPTORS: SHARP;BURNING
DESCRIPTORS: ACHING;BURNING

## 2025-05-20 ASSESSMENT — PAIN DESCRIPTION - PAIN TYPE: TYPE: ACUTE PAIN

## 2025-05-20 NOTE — ED PROVIDER NOTES
way that he describes his symptoms sounds almost like he caused a questionable pneumomediastinum when he sneezed.  I did opt to obtain a CBC, renal, and a CT of his soft tissues.    CT of the soft tissues with actually very normal with no findings of mediastinum or other complication that would have been caused by his sneeze and that would be causing his sore throat.  No signs of RPA or PTA.  At this time, patient likely has a mild viral pharyngitis that is unrelated to his knees despite the odd timeline in which things occurred.  No signs of strep throat on exam.  Will not test for such.  Will give Decadron for symptoms.  Of note patient also received Toradol with reported significant improvement in discomfort in his throat and improvement in feeling of swallowing.    In terms of his neck pain, I did confirm cervical radiculopathy with a C-spine that showed mild disease in C6 and C7 which is the dermatomal distribution that he is describing pain.  Decadron that I am giving him for his throat will likely help with this as well.  No motor or sensory deficits on exam, no indication for emergent MRI.  Will discharge home at this time with follow-up with spine surgery.  Patient given a work note.        Is this patient to be included in the SEP-1 core measure? No Exclusion criteria - the patient is NOT to be included for SEP-1 Core Measure due to: Infection is not suspected    Medical Decision Making  Problems Addressed:  Acute pharyngitis, unspecified etiology: acute illness or injury  Cervical radiculopathy: acute illness or injury    Amount and/or Complexity of Data Reviewed  External Data Reviewed: radiology and notes.     Details: Reviewed chart including prior ER visit  Labs: ordered. Decision-making details documented in ED Course.  Radiology: ordered. Decision-making details documented in ED Course.    Risk  OTC drugs.  Prescription drug management.        Clinical Impression     1. Cervical radiculopathy    2.

## 2025-05-20 NOTE — ED NOTES
Patient reviewed and discharged by MD. After visit summary given and instructed, IV cannula removed. Patient left in no signs of distress     Antonia Richards RN  05/20/25 0526

## 2025-05-20 NOTE — DISCHARGE INSTRUCTIONS
You likely have a viral infection causing a sore throat.  Your CT did show some disc disease in the lower part of your neck that is likely causing a pinched nerve and some of the pain in your scapula and left shoulder.  Follow-up with a spine doctor.  Take ibuprofen or Tylenol for pain

## 2025-05-21 ENCOUNTER — OFFICE VISIT (OUTPATIENT)
Dept: INTERNAL MEDICINE CLINIC | Age: 35
End: 2025-05-21

## 2025-05-21 VITALS
SYSTOLIC BLOOD PRESSURE: 120 MMHG | OXYGEN SATURATION: 97 % | HEART RATE: 83 BPM | WEIGHT: 224.4 LBS | TEMPERATURE: 98.2 F | BODY MASS INDEX: 28.81 KG/M2 | DIASTOLIC BLOOD PRESSURE: 82 MMHG

## 2025-05-21 DIAGNOSIS — Z76.89 ENCOUNTER TO ESTABLISH CARE: ICD-10-CM

## 2025-05-21 DIAGNOSIS — F31.9 BIPOLAR 1 DISORDER (HCC): ICD-10-CM

## 2025-05-21 DIAGNOSIS — R73.9 HYPERGLYCEMIA: ICD-10-CM

## 2025-05-21 DIAGNOSIS — F41.9 ANXIETY: Primary | ICD-10-CM

## 2025-05-21 DIAGNOSIS — J02.9 PHARYNGITIS, UNSPECIFIED ETIOLOGY: ICD-10-CM

## 2025-05-21 DIAGNOSIS — Z13.220 LIPID SCREENING: ICD-10-CM

## 2025-05-21 DIAGNOSIS — G43.709 CHRONIC MIGRAINE WITHOUT AURA WITHOUT STATUS MIGRAINOSUS, NOT INTRACTABLE: ICD-10-CM

## 2025-05-21 DIAGNOSIS — M54.12 CERVICAL RADICULOPATHY: ICD-10-CM

## 2025-05-21 DIAGNOSIS — Z13.1 DIABETES MELLITUS SCREENING: ICD-10-CM

## 2025-05-21 SDOH — HEALTH STABILITY: PHYSICAL HEALTH: ON AVERAGE, HOW MANY DAYS PER WEEK DO YOU ENGAGE IN MODERATE TO STRENUOUS EXERCISE (LIKE A BRISK WALK)?: 4 DAYS

## 2025-05-21 SDOH — ECONOMIC STABILITY: FOOD INSECURITY: WITHIN THE PAST 12 MONTHS, YOU WORRIED THAT YOUR FOOD WOULD RUN OUT BEFORE YOU GOT MONEY TO BUY MORE.: NEVER TRUE

## 2025-05-21 SDOH — ECONOMIC STABILITY: FOOD INSECURITY: WITHIN THE PAST 12 MONTHS, THE FOOD YOU BOUGHT JUST DIDN'T LAST AND YOU DIDN'T HAVE MONEY TO GET MORE.: NEVER TRUE

## 2025-05-21 SDOH — HEALTH STABILITY: PHYSICAL HEALTH: ON AVERAGE, HOW MANY MINUTES DO YOU ENGAGE IN EXERCISE AT THIS LEVEL?: 120 MIN

## 2025-05-21 ASSESSMENT — PATIENT HEALTH QUESTIONNAIRE - PHQ9
3. TROUBLE FALLING OR STAYING ASLEEP: MORE THAN HALF THE DAYS
SUM OF ALL RESPONSES TO PHQ QUESTIONS 1-9: 20
9. THOUGHTS THAT YOU WOULD BE BETTER OFF DEAD, OR OF HURTING YOURSELF: NOT AT ALL
10. IF YOU CHECKED OFF ANY PROBLEMS, HOW DIFFICULT HAVE THESE PROBLEMS MADE IT FOR YOU TO DO YOUR WORK, TAKE CARE OF THINGS AT HOME, OR GET ALONG WITH OTHER PEOPLE: VERY DIFFICULT
4. FEELING TIRED OR HAVING LITTLE ENERGY: NEARLY EVERY DAY
SUM OF ALL RESPONSES TO PHQ QUESTIONS 1-9: 20
5. POOR APPETITE OR OVEREATING: MORE THAN HALF THE DAYS
6. FEELING BAD ABOUT YOURSELF - OR THAT YOU ARE A FAILURE OR HAVE LET YOURSELF OR YOUR FAMILY DOWN: SEVERAL DAYS
7. TROUBLE CONCENTRATING ON THINGS, SUCH AS READING THE NEWSPAPER OR WATCHING TELEVISION: NEARLY EVERY DAY
SUM OF ALL RESPONSES TO PHQ QUESTIONS 1-9: 20
8. MOVING OR SPEAKING SO SLOWLY THAT OTHER PEOPLE COULD HAVE NOTICED. OR THE OPPOSITE, BEING SO FIGETY OR RESTLESS THAT YOU HAVE BEEN MOVING AROUND A LOT MORE THAN USUAL: NEARLY EVERY DAY
SUM OF ALL RESPONSES TO PHQ QUESTIONS 1-9: 20
2. FEELING DOWN, DEPRESSED OR HOPELESS: NEARLY EVERY DAY
1. LITTLE INTEREST OR PLEASURE IN DOING THINGS: NEARLY EVERY DAY

## 2025-05-21 ASSESSMENT — ENCOUNTER SYMPTOMS
SHORTNESS OF BREATH: 0
ABDOMINAL PAIN: 0
BLOOD IN STOOL: 0
NAUSEA: 0
RHINORRHEA: 0
VOMITING: 0

## 2025-05-21 ASSESSMENT — COLUMBIA-SUICIDE SEVERITY RATING SCALE - C-SSRS
1. WITHIN THE PAST MONTH, HAVE YOU WISHED YOU WERE DEAD OR WISHED YOU COULD GO TO SLEEP AND NOT WAKE UP?: YES
2. HAVE YOU ACTUALLY HAD ANY THOUGHTS OF KILLING YOURSELF?: NO
6. HAVE YOU EVER DONE ANYTHING, STARTED TO DO ANYTHING, OR PREPARED TO DO ANYTHING TO END YOUR LIFE?: NO

## 2025-05-21 NOTE — PATIENT INSTRUCTIONS
Schedule with psychiatry. Please let PCP know if unable to be seen within 2-3 weeks.  Symptomatic care for likely viral pharyngitis. If hoarse voice persists after 2 weeks, please notify the office  Blood work today  If you develop thoughts of self harm or suicidal thoughts, please call 911, call [phone], or go to the nearest hospital

## 2025-05-21 NOTE — PROGRESS NOTES
Cedric Freeman (:  1990) is a 35 y.o. male,New patient, here for evaluation of the following chief complaint(s):  Establish Care and Anxiety (3 months)         Assessment & Plan  Encounter to establish care    -Discussed medical history with patient today.  Reviewed recent workup at the ER on May 20.    Orders:    Hepatic Function Panel; Future    Hemoglobin A1C; Future    LIPID PANEL; Future    TSH reflex to FT4; Future    Anxiety    -Anxiety has been worsening over the last few months.  Of note, patient was hospitalized in [facility] for bipolar 1 in .  He has self tapered his medications in  and is not currently on any medicines for anxiety or bipolar.  He denies thoughts of self-harm.  He denies thoughts of suicide.    - Given concurrent history of bipolar 1 disorder with history of psychiatric hospitalization, will place urgent referral to psychiatry.  Patient reports he has a good support system.  Advised patient to call 911, call [phone], or go to the nearest hospital if any thoughts of self-harm or suicidal thoughts.  Orders:    John Mclaughlin LISW, [place], [facility]    Bipolar 1 disorder (HCC)    -Needs to establish with psychiatry.    Orders:    John Mclaughlin LISW, [place], [facility]    Pharyngitis, unspecified etiology    -Oropharynx is erythematous on exam.  No swelling or purulence of tonsils.  Associated with hoarse voice.  CT soft tissue neck on May 20 did not show any acute findings.    - Suspect this is a viral pharyngitis.  Discussed supportive care.  If no improvement in 1 to 2 weeks asked patient to notify.  If symptoms do not resolve will refer to ENT.       Chronic migraine without aura without status migrainosus, not intractable    -Pattern and frequency are unchanged since high school.  No red flags on exam today.  Patient does get relief with Excedrin.  Discussed neurology referral with patient.    Orders:    Ewa Rush MD,

## 2025-05-22 ENCOUNTER — RESULTS FOLLOW-UP (OUTPATIENT)
Dept: INTERNAL MEDICINE CLINIC | Age: 35
End: 2025-05-22

## 2025-05-22 ENCOUNTER — HOSPITAL ENCOUNTER (EMERGENCY)
Age: 35
Discharge: HOME | End: 2025-05-22
Attending: STUDENT IN AN ORGANIZED HEALTH CARE EDUCATION/TRAINING PROGRAM
Payer: COMMERCIAL

## 2025-05-22 VITALS
DIASTOLIC BLOOD PRESSURE: 89 MMHG | RESPIRATION RATE: 18 BRPM | SYSTOLIC BLOOD PRESSURE: 128 MMHG | OXYGEN SATURATION: 97 % | HEIGHT: 74 IN | HEART RATE: 69 BPM | WEIGHT: 223.8 LBS | BODY MASS INDEX: 28.72 KG/M2 | TEMPERATURE: 98.1 F

## 2025-05-22 DIAGNOSIS — M62.838 SPASM OF MUSCLE: Primary | ICD-10-CM

## 2025-05-22 PROBLEM — G43.709 CHRONIC MIGRAINE WITHOUT AURA WITHOUT STATUS MIGRAINOSUS, NOT INTRACTABLE: Status: ACTIVE | Noted: 2025-05-22

## 2025-05-22 PROBLEM — R73.9 HYPERGLYCEMIA: Status: ACTIVE | Noted: 2025-05-22

## 2025-05-22 PROBLEM — F41.9 ANXIETY: Status: ACTIVE | Noted: 2025-05-22

## 2025-05-22 PROBLEM — F31.9 BIPOLAR 1 DISORDER (HCC): Status: ACTIVE | Noted: 2025-05-22

## 2025-05-22 LAB
ALBUMIN SERPL-MCNC: 4.6 G/DL (ref 3.4–5)
ALP SERPL-CCNC: 97 U/L (ref 40–129)
ALT SERPL-CCNC: 27 U/L (ref 10–40)
AST SERPL-CCNC: 21 U/L (ref 15–37)
BILIRUB DIRECT SERPL-MCNC: <0.1 MG/DL (ref 0–0.3)
BILIRUB INDIRECT SERPL-MCNC: 0.3 MG/DL (ref 0–1)
BILIRUB SERPL-MCNC: 0.4 MG/DL (ref 0–1)
CHOLEST SERPL-MCNC: 222 MG/DL (ref 0–199)
EST. AVERAGE GLUCOSE BLD GHB EST-MCNC: 114 MG/DL
HBA1C MFR BLD: 5.6 %
HDLC SERPL-MCNC: 32 MG/DL (ref 40–60)
LDLC SERPL CALC-MCNC: 143 MG/DL
PROT SERPL-MCNC: 7.6 G/DL (ref 6.4–8.2)
TRIGL SERPL-MCNC: 237 MG/DL (ref 0–150)
TSH SERPL DL<=0.005 MIU/L-ACNC: 0.75 UIU/ML (ref 0.27–4.2)
VLDLC SERPL CALC-MCNC: 47 MG/DL

## 2025-05-22 PROCEDURE — 6360000002 HC RX W HCPCS

## 2025-05-22 PROCEDURE — 6370000000 HC RX 637 (ALT 250 FOR IP)

## 2025-05-22 PROCEDURE — 96372 THER/PROPH/DIAG INJ SC/IM: CPT

## 2025-05-22 PROCEDURE — 99284 EMERGENCY DEPT VISIT MOD MDM: CPT

## 2025-05-22 RX ORDER — KETOROLAC TROMETHAMINE 30 MG/ML
30 INJECTION, SOLUTION INTRAMUSCULAR; INTRAVENOUS ONCE
Status: COMPLETED | OUTPATIENT
Start: 2025-05-22 | End: 2025-05-22

## 2025-05-22 RX ORDER — METHOCARBAMOL 500 MG/1
750 TABLET, FILM COATED ORAL ONCE
Status: COMPLETED | OUTPATIENT
Start: 2025-05-22 | End: 2025-05-22

## 2025-05-22 RX ORDER — LIDOCAINE 4 G/G
1 PATCH TOPICAL DAILY
Status: DISCONTINUED | OUTPATIENT
Start: 2025-05-22 | End: 2025-05-22 | Stop reason: HOSPADM

## 2025-05-22 RX ORDER — ACETAMINOPHEN 500 MG
1000 TABLET ORAL
Status: COMPLETED | OUTPATIENT
Start: 2025-05-22 | End: 2025-05-22

## 2025-05-22 RX ORDER — METHOCARBAMOL 500 MG/1
500 TABLET, FILM COATED ORAL 4 TIMES DAILY PRN
Qty: 40 TABLET | Refills: 0 | Status: SHIPPED | OUTPATIENT
Start: 2025-05-22 | End: 2025-06-01

## 2025-05-22 RX ADMIN — ACETAMINOPHEN 1000 MG: 500 TABLET ORAL at 15:39

## 2025-05-22 RX ADMIN — KETOROLAC TROMETHAMINE 30 MG: 30 INJECTION, SOLUTION INTRAMUSCULAR at 15:39

## 2025-05-22 RX ADMIN — METHOCARBAMOL 750 MG: 500 TABLET ORAL at 15:39

## 2025-05-22 ASSESSMENT — ENCOUNTER SYMPTOMS
SORE THROAT: 0
CHEST TIGHTNESS: 0
VOICE CHANGE: 1

## 2025-05-22 ASSESSMENT — PAIN DESCRIPTION - ORIENTATION
ORIENTATION: UPPER
ORIENTATION: UPPER;LEFT

## 2025-05-22 ASSESSMENT — PAIN SCALES - GENERAL
PAINLEVEL_OUTOF10: 10
PAINLEVEL_OUTOF10: 3
PAINLEVEL_OUTOF10: 10

## 2025-05-22 ASSESSMENT — PAIN - FUNCTIONAL ASSESSMENT
PAIN_FUNCTIONAL_ASSESSMENT: PREVENTS OR INTERFERES SOME ACTIVE ACTIVITIES AND ADLS
PAIN_FUNCTIONAL_ASSESSMENT: 0-10

## 2025-05-22 ASSESSMENT — PAIN DESCRIPTION - LOCATION
LOCATION: BACK

## 2025-05-22 ASSESSMENT — LIFESTYLE VARIABLES
HOW OFTEN DO YOU HAVE A DRINK CONTAINING ALCOHOL: NEVER
HOW MANY STANDARD DRINKS CONTAINING ALCOHOL DO YOU HAVE ON A TYPICAL DAY: PATIENT DOES NOT DRINK

## 2025-05-22 ASSESSMENT — PAIN DESCRIPTION - ONSET: ONSET: PROGRESSIVE

## 2025-05-22 ASSESSMENT — PAIN DESCRIPTION - DESCRIPTORS
DESCRIPTORS: BURNING
DESCRIPTORS: POUNDING

## 2025-05-22 ASSESSMENT — PAIN DESCRIPTION - PAIN TYPE: TYPE: ACUTE PAIN

## 2025-05-22 ASSESSMENT — PAIN DESCRIPTION - FREQUENCY: FREQUENCY: CONTINUOUS

## 2025-05-22 NOTE — ED PROVIDER NOTES
THE [facility]  EMERGENCY DEPARTMENT ENCOUNTER          EM RESIDENT NOTE       Date of evaluation: 5/22/2025    Chief Complaint     Back Pain      History of Present Illness     Cedric Freeman is a 35 y.o. male past medical history of bipolar 1 and anxiety who presents with chief complaint of upper back and left shoulder pain.  Patient states that since being seen in the ER 3 days ago, he has continued to have bad pain in his upper back and left shoulder.  He showed CT of the soft tissues without findings of pneumomediastinum or other complications, additionally without signs of RPA or PTA, he was diagnosed with viral pharyngitis and was given a dose of Decadron and Toradol as well as a referral to follow-up with spine surgery for mild C6/C7 cervical radiculopathy.  Patient has an appointment with spine on 6/9, but given ongoing pain making it difficult to sleep wanted to present for additional workup.  Today.  Denies chest pain, difficulty breathing, fevers, headaches, vision changes, falls, trauma to the area.  States area just feels extremely tight and sore to move, but denies weakness or numbness in the distal arm.    MEDICAL DECISION MAKING / ASSESSMENT / PLAN     INITIAL VITALS: BP: (!) 139/107, Temp: 98.1 °F (36.7 °C), Pulse: 75, Respirations: 18, SpO2: 100 %    Cedric Freeman is a 35 y.o. male presenting for evaluation of left paraspinal upper back tenderness and left shoulder pain.  Given lack of traumatic injury and full range of motion of the shoulder without bony tenderness to palpation, low concern for fracture at this time, as such x-rays were not ordered.  Patient was treated with Tylenol, Robaxin, Toradol, and Lidoderm patch with moderate improvement in symptoms.  As such, it is felt at this time patient is appropriate for discharge home.  He will be given a prescription for Robaxin as well as instructions for Tylenol/Motrin/Lidoderm patches at home and instructions to keep his      He has no past medical history on file.  He has no past surgical history on file.  His family history is not on file.  He reports that he has been smoking cigarettes. He has been exposed to tobacco smoke. He has never used smokeless tobacco. He reports that he does not currently use alcohol. He reports current drug use. Drug: Marijuana (Weed).    Medications     Discharge Medication List as of 5/22/2025  5:05 PM        CONTINUE these medications which have NOT CHANGED    Details   aspirin-acetaminophen-caffeine (EXCEDRIN MIGRAINE) 250-250-65 MG per tablet Take 1 tablet by mouth every 6 hours as needed for Headaches excedrinHistorical Med             Allergies     He is allergic to bee venom and amoxicillin.    Physical Exam     INITIAL VITALS: BP: (!) 139/107, Temp: 98.1 °F (36.7 °C), Pulse: 75, Respirations: 18, SpO2: 100 %   Physical Exam  Constitutional:       General: He is not in acute distress.     Appearance: Normal appearance. He is not ill-appearing.   HENT:      Head: Normocephalic and atraumatic.      Mouth/Throat:      Mouth: Mucous membranes are moist.   Eyes:      General:         Right eye: No discharge.         Left eye: No discharge.      Extraocular Movements: Extraocular movements intact.      Conjunctiva/sclera: Conjunctivae normal.   Cardiovascular:      Rate and Rhythm: Normal rate and regular rhythm.      Pulses: Normal pulses.      Heart sounds: Normal heart sounds. No murmur heard.  Pulmonary:      Effort: Pulmonary effort is normal.      Breath sounds: Normal breath sounds. No wheezing or rhonchi.   Abdominal:      General: There is no distension.      Palpations: Abdomen is soft.      Tenderness: There is no abdominal tenderness. There is no right CVA tenderness, left CVA tenderness, guarding or rebound.   Musculoskeletal:         General: No swelling or signs of injury. Normal range of motion.      Cervical back: Normal range of motion.      Comments: Tenderness palpation over the

## 2025-05-22 NOTE — ASSESSMENT & PLAN NOTE
-Pattern and frequency are unchanged since high school.  No red flags on exam today.  Patient does get relief with Excedrin.  Discussed neurology referral with patient.    Orders:    ALEX - Ewa Rosado MD, Neurology, [facility]

## 2025-05-22 NOTE — ASSESSMENT & PLAN NOTE
-Noted on labs from hospital.  Likely stress related but will change hemoglobin A1c.    Orders:    Hemoglobin A1C; Future

## 2025-05-22 NOTE — ASSESSMENT & PLAN NOTE
-Needs to establish with psychiatry.    Orders:    [facility] - John Newby LISW, [place], [place]

## 2025-05-22 NOTE — ED PROVIDER NOTES
ED Attending Attestation Note     Date of evaluation: 5/22/2025    I have discussed the case with the resident. I have personally performed a history, physical exam, and my own medical decision making. I have reviewed the note and agree with the findings and plan.     INITIAL VITALS: BP: (!) 139/107, Temp: 98.1 °F (36.7 °C), Pulse: 75, Respirations: 18, SpO2: 100 %     MDM:  My assessment reveals a well-appearing 35-year-old male presenting with left neck and left arm pain.  He is having pain that radiates down his left arm from his neck.  He is neurologically intact.  He has an appointment with the spine team in 2 weeks.  He took an old hydrocodone he had at home with minimal improvement in his symptoms.  On exam, he has minimal tenderness to palpation but does endorse radicular pain in the left arm.  Suspect he has cervical radiculopathy.  He was treated with multimodal pain medication and ultimately discharged home with his previously scheduled spine referral         Juice Gaston MD  05/22/25 2025

## 2025-05-22 NOTE — DISCHARGE INSTRUCTIONS
You were seen in the hospital for:  1. Spasm of muscle      Your evaluation found:  - Spasm and strain of the muscle in your upper back and left shoulder    You were treated with:  - Robaxin, Tylenol, Toradol, and a lidocaine patch    Specific instructions for discharge:  - We have been given a prescription for Robaxin, which you can take up to 4 times a day as needed for muscle spasm.    For pain, you can take acetaminophen (Tylenol) 1000 mg and ibuprofen (Advil, Aleve, Motrin) 600 mg every 6 hours as needed, or alternating every 3 hours (e.g., Tylenol at 12 PM, ibuprofen at 3 PM, Tylenol at 6 PM, ibuprofen at 9 PM, and so on). Do not take these medications for more than 1 week without consulting your primary care doctor.    Do NOT take acetaminophen (Tylenol) if you are drinking alcohol. Taking acetaminophen in excessive amounts or with alcohol has the potential to cause severe and even life-threatening liver injury. It is always important to read the active ingredients of any medication before taking it. This includes both prescription and over-the-counter medications. Some common medications such as Percocet, Vicodin, [place] Plus, DayQuil, NyQuil, Midol, certain cough medicines, Triaminic, Sudafed, Vicks, Zicam, Fioricet, Lortab, Excedrin, and many other formulations may contain acetaminophen. This is another reason why it is always important to look at the active ingredients on a medication before taking it in order to prevent dangerous overdose. Taking ibuprofen may put some people at increased risk of developing stomach ulcers and kidney problems. You can reduce this risk by staying hydrated and taking ibuprofen with food.     Lidocaine Patches for Pain  I recommend the use of lidocaine pain patches to help with your pain. While these patches are available in prescription strength (5%), they can be rather expensive and often insurance companies do not cover their cost.    There is an over-the-counter

## 2025-05-22 NOTE — ASSESSMENT & PLAN NOTE
-Anxiety has been worsening over the last few months.  Of note, patient was hospitalized in [facility] for bipolar 1 in 2020.  He has self tapered his medications in 2022 and is not currently on any medicines for anxiety or bipolar.  He denies thoughts of self-harm.  He denies thoughts of suicide.    - Given concurrent history of bipolar 1 disorder with history of psychiatric hospitalization, will place urgent referral to psychiatry.  Patient reports he has a good support system.  Advised patient to call 911, call [phone], or go to the nearest hospital if any thoughts of self-harm or suicidal thoughts.  Orders:    ALEX - John Newby LISW, Counseling, [facility]

## 2025-05-22 NOTE — ED TRIAGE NOTES
Patient presents to the ED from home with c/o upper left sided back pain that radiates to his shoulder.  Patient was seen in this ED on Monday for same issue.  Has follow up with ortho.  Cordellt on June 9th.